# Patient Record
Sex: MALE | Race: WHITE | NOT HISPANIC OR LATINO | URBAN - METROPOLITAN AREA
[De-identification: names, ages, dates, MRNs, and addresses within clinical notes are randomized per-mention and may not be internally consistent; named-entity substitution may affect disease eponyms.]

---

## 2022-01-01 ENCOUNTER — EMERGENCY (EMERGENCY)
Facility: HOSPITAL | Age: 87
LOS: 1 days | Discharge: LEFT BEFORE DISCHARGE | End: 2022-01-01
Attending: EMERGENCY MEDICINE
Payer: MEDICARE

## 2022-01-01 ENCOUNTER — INPATIENT (INPATIENT)
Facility: HOSPITAL | Age: 87
LOS: 0 days | DRG: 308 | End: 2022-11-25
Attending: HOSPITALIST | Admitting: STUDENT IN AN ORGANIZED HEALTH CARE EDUCATION/TRAINING PROGRAM
Payer: MEDICARE

## 2022-01-01 VITALS
WEIGHT: 184.97 LBS | OXYGEN SATURATION: 95 % | HEIGHT: 69 IN | SYSTOLIC BLOOD PRESSURE: 119 MMHG | TEMPERATURE: 98 F | HEART RATE: 50 BPM | DIASTOLIC BLOOD PRESSURE: 53 MMHG | RESPIRATION RATE: 20 BRPM

## 2022-01-01 VITALS
HEART RATE: 43 BPM | RESPIRATION RATE: 20 BRPM | TEMPERATURE: 99 F | SYSTOLIC BLOOD PRESSURE: 149 MMHG | OXYGEN SATURATION: 100 % | DIASTOLIC BLOOD PRESSURE: 85 MMHG

## 2022-01-01 VITALS
SYSTOLIC BLOOD PRESSURE: 100 MMHG | WEIGHT: 199.96 LBS | TEMPERATURE: 98 F | HEART RATE: 55 BPM | HEIGHT: 67 IN | OXYGEN SATURATION: 96 % | DIASTOLIC BLOOD PRESSURE: 52 MMHG | RESPIRATION RATE: 16 BRPM

## 2022-01-01 DIAGNOSIS — D53.9 NUTRITIONAL ANEMIA, UNSPECIFIED: ICD-10-CM

## 2022-01-01 DIAGNOSIS — Z51.5 ENCOUNTER FOR PALLIATIVE CARE: ICD-10-CM

## 2022-01-01 DIAGNOSIS — R94.31 ABNORMAL ELECTROCARDIOGRAM [ECG] [EKG]: ICD-10-CM

## 2022-01-01 DIAGNOSIS — E78.5 HYPERLIPIDEMIA, UNSPECIFIED: ICD-10-CM

## 2022-01-01 DIAGNOSIS — I10 ESSENTIAL (PRIMARY) HYPERTENSION: ICD-10-CM

## 2022-01-01 DIAGNOSIS — R53.81 OTHER MALAISE: ICD-10-CM

## 2022-01-01 DIAGNOSIS — J10.1 INFLUENZA DUE TO OTHER IDENTIFIED INFLUENZA VIRUS WITH OTHER RESPIRATORY MANIFESTATIONS: ICD-10-CM

## 2022-01-01 DIAGNOSIS — G93.41 METABOLIC ENCEPHALOPATHY: ICD-10-CM

## 2022-01-01 DIAGNOSIS — I47.21 TORSADES DE POINTES: ICD-10-CM

## 2022-01-01 DIAGNOSIS — R68.89 OTHER GENERAL SYMPTOMS AND SIGNS: ICD-10-CM

## 2022-01-01 DIAGNOSIS — Z71.89 OTHER SPECIFIED COUNSELING: ICD-10-CM

## 2022-01-01 DIAGNOSIS — R00.1 BRADYCARDIA, UNSPECIFIED: ICD-10-CM

## 2022-01-01 DIAGNOSIS — Z29.9 ENCOUNTER FOR PROPHYLACTIC MEASURES, UNSPECIFIED: ICD-10-CM

## 2022-01-01 DIAGNOSIS — I25.10 ATHEROSCLEROTIC HEART DISEASE OF NATIVE CORONARY ARTERY WITHOUT ANGINA PECTORIS: ICD-10-CM

## 2022-01-01 LAB
ALBUMIN SERPL ELPH-MCNC: 3.5 G/DL — SIGNIFICANT CHANGE UP (ref 3.3–5)
ALP SERPL-CCNC: 45 U/L — SIGNIFICANT CHANGE UP (ref 40–120)
ALT FLD-CCNC: 13 U/L — SIGNIFICANT CHANGE UP (ref 10–45)
ANION GAP SERPL CALC-SCNC: 12 MMOL/L — SIGNIFICANT CHANGE UP (ref 5–17)
ANION GAP SERPL CALC-SCNC: 19 MMOL/L — HIGH (ref 5–17)
ANISOCYTOSIS BLD QL: SLIGHT — SIGNIFICANT CHANGE UP
APTT BLD: 30.8 SEC — SIGNIFICANT CHANGE UP (ref 27.5–35.5)
AST SERPL-CCNC: 53 U/L — HIGH (ref 10–40)
BASE EXCESS BLDV CALC-SCNC: -1.9 MMOL/L — SIGNIFICANT CHANGE UP (ref -2–3)
BASOPHILS # BLD AUTO: 0 K/UL — SIGNIFICANT CHANGE UP (ref 0–0.2)
BASOPHILS # BLD AUTO: 0.03 K/UL — SIGNIFICANT CHANGE UP (ref 0–0.2)
BASOPHILS NFR BLD AUTO: 0 % — SIGNIFICANT CHANGE UP (ref 0–2)
BASOPHILS NFR BLD AUTO: 0.4 % — SIGNIFICANT CHANGE UP (ref 0–2)
BILIRUB SERPL-MCNC: 1 MG/DL — SIGNIFICANT CHANGE UP (ref 0.2–1.2)
BLOOD GAS VENOUS - CREATININE: SIGNIFICANT CHANGE UP MG/DL (ref 0.5–1.3)
BUN SERPL-MCNC: 27 MG/DL — HIGH (ref 7–23)
BUN SERPL-MCNC: 33 MG/DL — HIGH (ref 7–23)
CA-I SERPL-SCNC: 1.15 MMOL/L — SIGNIFICANT CHANGE UP (ref 1.15–1.33)
CALCIUM SERPL-MCNC: 9.1 MG/DL — SIGNIFICANT CHANGE UP (ref 8.4–10.5)
CALCIUM SERPL-MCNC: 9.2 MG/DL — SIGNIFICANT CHANGE UP (ref 8.4–10.5)
CHLORIDE BLDV-SCNC: 103 MMOL/L — SIGNIFICANT CHANGE UP (ref 96–108)
CHLORIDE SERPL-SCNC: 103 MMOL/L — SIGNIFICANT CHANGE UP (ref 96–108)
CHLORIDE SERPL-SCNC: 106 MMOL/L — SIGNIFICANT CHANGE UP (ref 96–108)
CO2 BLDV-SCNC: 23 MMOL/L — SIGNIFICANT CHANGE UP (ref 22–26)
CO2 SERPL-SCNC: 16 MMOL/L — LOW (ref 22–31)
CO2 SERPL-SCNC: 20 MMOL/L — LOW (ref 22–31)
CREAT SERPL-MCNC: 1.5 MG/DL — HIGH (ref 0.5–1.3)
CREAT SERPL-MCNC: 1.64 MG/DL — HIGH (ref 0.5–1.3)
DACRYOCYTES BLD QL SMEAR: SLIGHT — SIGNIFICANT CHANGE UP
EGFR: 38 ML/MIN/1.73M2 — LOW
EGFR: 43 ML/MIN/1.73M2 — LOW
EOSINOPHIL # BLD AUTO: 0 K/UL — SIGNIFICANT CHANGE UP (ref 0–0.5)
EOSINOPHIL # BLD AUTO: 0 K/UL — SIGNIFICANT CHANGE UP (ref 0–0.5)
EOSINOPHIL NFR BLD AUTO: 0 % — SIGNIFICANT CHANGE UP (ref 0–6)
EOSINOPHIL NFR BLD AUTO: 0 % — SIGNIFICANT CHANGE UP (ref 0–6)
FERRITIN SERPL-MCNC: 365 NG/ML — SIGNIFICANT CHANGE UP (ref 30–400)
FLUAV H1 2009 PAND RNA SPEC QL NAA+PROBE: DETECTED
FOLATE SERPL-MCNC: 9.6 NG/ML — SIGNIFICANT CHANGE UP
GAS PNL BLDV: 134 MMOL/L — LOW (ref 136–145)
GAS PNL BLDV: SIGNIFICANT CHANGE UP
GAS PNL BLDV: SIGNIFICANT CHANGE UP
GLUCOSE BLDV-MCNC: 109 MG/DL — HIGH (ref 70–99)
GLUCOSE SERPL-MCNC: 113 MG/DL — HIGH (ref 70–99)
GLUCOSE SERPL-MCNC: 117 MG/DL — HIGH (ref 70–99)
HCO3 BLDV-SCNC: 22 MMOL/L — SIGNIFICANT CHANGE UP (ref 22–29)
HCT VFR BLD CALC: 39.1 % — SIGNIFICANT CHANGE UP (ref 39–50)
HCT VFR BLD CALC: 43.3 % — SIGNIFICANT CHANGE UP (ref 39–50)
HCT VFR BLDA CALC: 40 % — SIGNIFICANT CHANGE UP (ref 39–51)
HGB BLD CALC-MCNC: 13.3 G/DL — SIGNIFICANT CHANGE UP (ref 12.6–17.4)
HGB BLD-MCNC: 13.2 G/DL — SIGNIFICANT CHANGE UP (ref 13–17)
HGB BLD-MCNC: 14.4 G/DL — SIGNIFICANT CHANGE UP (ref 13–17)
IMM GRANULOCYTES NFR BLD AUTO: 0.1 % — SIGNIFICANT CHANGE UP (ref 0–0.9)
INR BLD: 1.2 RATIO — HIGH (ref 0.88–1.16)
LACTATE BLDV-MCNC: 1.8 MMOL/L — SIGNIFICANT CHANGE UP (ref 0.5–2)
LYMPHOCYTES # BLD AUTO: 0.21 K/UL — LOW (ref 1–3.3)
LYMPHOCYTES # BLD AUTO: 0.9 K/UL — LOW (ref 1–3.3)
LYMPHOCYTES # BLD AUTO: 11.6 % — LOW (ref 13–44)
LYMPHOCYTES # BLD AUTO: 2.6 % — LOW (ref 13–44)
MACROCYTES BLD QL: SLIGHT — SIGNIFICANT CHANGE UP
MAGNESIUM SERPL-MCNC: 2 MG/DL — SIGNIFICANT CHANGE UP (ref 1.6–2.6)
MAGNESIUM SERPL-MCNC: 2.4 MG/DL — SIGNIFICANT CHANGE UP (ref 1.6–2.6)
MANUAL SMEAR VERIFICATION: SIGNIFICANT CHANGE UP
MCHC RBC-ENTMCNC: 33.3 GM/DL — SIGNIFICANT CHANGE UP (ref 32–36)
MCHC RBC-ENTMCNC: 33.8 GM/DL — SIGNIFICANT CHANGE UP (ref 32–36)
MCHC RBC-ENTMCNC: 35.8 PG — HIGH (ref 27–34)
MCHC RBC-ENTMCNC: 35.9 PG — HIGH (ref 27–34)
MCV RBC AUTO: 106 FL — HIGH (ref 80–100)
MCV RBC AUTO: 108 FL — HIGH (ref 80–100)
MONOCYTES # BLD AUTO: 1.06 K/UL — HIGH (ref 0–0.9)
MONOCYTES # BLD AUTO: 1.11 K/UL — HIGH (ref 0–0.9)
MONOCYTES NFR BLD AUTO: 13.3 % — SIGNIFICANT CHANGE UP (ref 2–14)
MONOCYTES NFR BLD AUTO: 14.3 % — HIGH (ref 2–14)
NEUTROPHILS # BLD AUTO: 5.72 K/UL — SIGNIFICANT CHANGE UP (ref 1.8–7.4)
NEUTROPHILS # BLD AUTO: 6.71 K/UL — SIGNIFICANT CHANGE UP (ref 1.8–7.4)
NEUTROPHILS NFR BLD AUTO: 73.6 % — SIGNIFICANT CHANGE UP (ref 43–77)
NEUTROPHILS NFR BLD AUTO: 84.1 % — HIGH (ref 43–77)
NRBC # BLD: 0 /100 WBCS — SIGNIFICANT CHANGE UP (ref 0–0)
NRBC # BLD: 1 /100 — HIGH (ref 0–0)
OVALOCYTES BLD QL SMEAR: SLIGHT — SIGNIFICANT CHANGE UP
PCO2 BLDV: 35 MMHG — LOW (ref 42–55)
PH BLDV: 7.41 — SIGNIFICANT CHANGE UP (ref 7.32–7.43)
PHOSPHATE SERPL-MCNC: 2.7 MG/DL — SIGNIFICANT CHANGE UP (ref 2.5–4.5)
PHOSPHATE SERPL-MCNC: 4.5 MG/DL — SIGNIFICANT CHANGE UP (ref 2.5–4.5)
PLAT MORPH BLD: NORMAL — SIGNIFICANT CHANGE UP
PLATELET # BLD AUTO: 117 K/UL — LOW (ref 150–400)
PLATELET # BLD AUTO: 129 K/UL — LOW (ref 150–400)
PO2 BLDV: 59 MMHG — HIGH (ref 25–45)
POIKILOCYTOSIS BLD QL AUTO: SLIGHT — SIGNIFICANT CHANGE UP
POTASSIUM BLDV-SCNC: 4.7 MMOL/L — SIGNIFICANT CHANGE UP (ref 3.5–5.1)
POTASSIUM SERPL-MCNC: 4.2 MMOL/L — SIGNIFICANT CHANGE UP (ref 3.5–5.3)
POTASSIUM SERPL-MCNC: 5.2 MMOL/L — SIGNIFICANT CHANGE UP (ref 3.5–5.3)
POTASSIUM SERPL-SCNC: 4.2 MMOL/L — SIGNIFICANT CHANGE UP (ref 3.5–5.3)
POTASSIUM SERPL-SCNC: 5.2 MMOL/L — SIGNIFICANT CHANGE UP (ref 3.5–5.3)
PROT SERPL-MCNC: 7 G/DL — SIGNIFICANT CHANGE UP (ref 6–8.3)
PROTHROM AB SERPL-ACNC: 13.8 SEC — HIGH (ref 10.5–13.4)
RAPID RVP RESULT: DETECTED
RBC # BLD: 3.69 M/UL — LOW (ref 4.2–5.8)
RBC # BLD: 4.01 M/UL — LOW (ref 4.2–5.8)
RBC # FLD: 14.4 % — SIGNIFICANT CHANGE UP (ref 10.3–14.5)
RBC # FLD: 14.9 % — HIGH (ref 10.3–14.5)
RBC BLD AUTO: ABNORMAL
SAO2 % BLDV: 90.5 % — HIGH (ref 67–88)
SARS-COV-2 RNA SPEC QL NAA+PROBE: SIGNIFICANT CHANGE UP
SODIUM SERPL-SCNC: 135 MMOL/L — SIGNIFICANT CHANGE UP (ref 135–145)
SODIUM SERPL-SCNC: 141 MMOL/L — SIGNIFICANT CHANGE UP (ref 135–145)
TROPONIN T, HIGH SENSITIVITY RESULT: 109 NG/L — HIGH (ref 0–51)
TROPONIN T, HIGH SENSITIVITY RESULT: 97 NG/L — HIGH (ref 0–51)
VIT B12 SERPL-MCNC: 189 PG/ML — LOW (ref 232–1245)
WBC # BLD: 7.77 K/UL — SIGNIFICANT CHANGE UP (ref 3.8–10.5)
WBC # BLD: 7.98 K/UL — SIGNIFICANT CHANGE UP (ref 3.8–10.5)
WBC # FLD AUTO: 7.77 K/UL — SIGNIFICANT CHANGE UP (ref 3.8–10.5)
WBC # FLD AUTO: 7.98 K/UL — SIGNIFICANT CHANGE UP (ref 3.8–10.5)

## 2022-01-01 PROCEDURE — 82962 GLUCOSE BLOOD TEST: CPT

## 2022-01-01 PROCEDURE — 0225U NFCT DS DNA&RNA 21 SARSCOV2: CPT

## 2022-01-01 PROCEDURE — 82947 ASSAY GLUCOSE BLOOD QUANT: CPT

## 2022-01-01 PROCEDURE — 73030 X-RAY EXAM OF SHOULDER: CPT | Mod: 26,LT

## 2022-01-01 PROCEDURE — 36415 COLL VENOUS BLD VENIPUNCTURE: CPT

## 2022-01-01 PROCEDURE — 82746 ASSAY OF FOLIC ACID SERUM: CPT

## 2022-01-01 PROCEDURE — 85730 THROMBOPLASTIN TIME PARTIAL: CPT

## 2022-01-01 PROCEDURE — 82330 ASSAY OF CALCIUM: CPT

## 2022-01-01 PROCEDURE — 93010 ELECTROCARDIOGRAM REPORT: CPT

## 2022-01-01 PROCEDURE — 85610 PROTHROMBIN TIME: CPT

## 2022-01-01 PROCEDURE — 99233 SBSQ HOSP IP/OBS HIGH 50: CPT

## 2022-01-01 PROCEDURE — 99223 1ST HOSP IP/OBS HIGH 75: CPT

## 2022-01-01 PROCEDURE — 82803 BLOOD GASES ANY COMBINATION: CPT

## 2022-01-01 PROCEDURE — 93005 ELECTROCARDIOGRAM TRACING: CPT

## 2022-01-01 PROCEDURE — 82435 ASSAY OF BLOOD CHLORIDE: CPT

## 2022-01-01 PROCEDURE — 82728 ASSAY OF FERRITIN: CPT

## 2022-01-01 PROCEDURE — 84100 ASSAY OF PHOSPHORUS: CPT

## 2022-01-01 PROCEDURE — 99285 EMERGENCY DEPT VISIT HI MDM: CPT

## 2022-01-01 PROCEDURE — 99497 ADVNCD CARE PLAN 30 MIN: CPT | Mod: 25

## 2022-01-01 PROCEDURE — 99283 EMERGENCY DEPT VISIT LOW MDM: CPT

## 2022-01-01 PROCEDURE — 85025 COMPLETE CBC W/AUTO DIFF WBC: CPT

## 2022-01-01 PROCEDURE — 73030 X-RAY EXAM OF SHOULDER: CPT

## 2022-01-01 PROCEDURE — 84484 ASSAY OF TROPONIN QUANT: CPT

## 2022-01-01 PROCEDURE — 72170 X-RAY EXAM OF PELVIS: CPT | Mod: 26

## 2022-01-01 PROCEDURE — 70450 CT HEAD/BRAIN W/O DYE: CPT | Mod: MA

## 2022-01-01 PROCEDURE — 82607 VITAMIN B-12: CPT

## 2022-01-01 PROCEDURE — 96374 THER/PROPH/DIAG INJ IV PUSH: CPT

## 2022-01-01 PROCEDURE — 71045 X-RAY EXAM CHEST 1 VIEW: CPT | Mod: 26

## 2022-01-01 PROCEDURE — 85014 HEMATOCRIT: CPT

## 2022-01-01 PROCEDURE — 72125 CT NECK SPINE W/O DYE: CPT | Mod: MA

## 2022-01-01 PROCEDURE — 83540 ASSAY OF IRON: CPT

## 2022-01-01 PROCEDURE — 71045 X-RAY EXAM CHEST 1 VIEW: CPT

## 2022-01-01 PROCEDURE — 80048 BASIC METABOLIC PNL TOTAL CA: CPT

## 2022-01-01 PROCEDURE — 80053 COMPREHEN METABOLIC PANEL: CPT

## 2022-01-01 PROCEDURE — 83550 IRON BINDING TEST: CPT

## 2022-01-01 PROCEDURE — 87040 BLOOD CULTURE FOR BACTERIA: CPT

## 2022-01-01 PROCEDURE — 99284 EMERGENCY DEPT VISIT MOD MDM: CPT

## 2022-01-01 PROCEDURE — 83735 ASSAY OF MAGNESIUM: CPT

## 2022-01-01 PROCEDURE — 85018 HEMOGLOBIN: CPT

## 2022-01-01 PROCEDURE — 72125 CT NECK SPINE W/O DYE: CPT | Mod: 26,MA

## 2022-01-01 PROCEDURE — 70450 CT HEAD/BRAIN W/O DYE: CPT | Mod: 26,MA

## 2022-01-01 PROCEDURE — 84295 ASSAY OF SERUM SODIUM: CPT

## 2022-01-01 PROCEDURE — 82565 ASSAY OF CREATININE: CPT

## 2022-01-01 PROCEDURE — 72170 X-RAY EXAM OF PELVIS: CPT

## 2022-01-01 PROCEDURE — 99223 1ST HOSP IP/OBS HIGH 75: CPT | Mod: GC

## 2022-01-01 PROCEDURE — 84132 ASSAY OF SERUM POTASSIUM: CPT

## 2022-01-01 PROCEDURE — 83605 ASSAY OF LACTIC ACID: CPT

## 2022-01-01 RX ORDER — SODIUM CHLORIDE 9 MG/ML
1000 INJECTION, SOLUTION INTRAVENOUS
Refills: 0 | Status: DISCONTINUED | OUTPATIENT
Start: 2022-01-01 | End: 2022-01-01

## 2022-01-01 RX ORDER — OLANZAPINE 15 MG/1
10 TABLET, FILM COATED ORAL ONCE
Refills: 0 | Status: COMPLETED | OUTPATIENT
Start: 2022-01-01 | End: 2022-01-01

## 2022-01-01 RX ORDER — OLANZAPINE 15 MG/1
5 TABLET, FILM COATED ORAL ONCE
Refills: 0 | Status: DISCONTINUED | OUTPATIENT
Start: 2022-01-01 | End: 2022-01-01

## 2022-01-01 RX ORDER — MAGNESIUM SULFATE 500 MG/ML
2 VIAL (ML) INJECTION ONCE
Refills: 0 | Status: DISCONTINUED | OUTPATIENT
Start: 2022-01-01 | End: 2022-01-01

## 2022-01-01 RX ORDER — OLANZAPINE 15 MG/1
10 TABLET, FILM COATED ORAL ONCE
Refills: 0 | Status: DISCONTINUED | OUTPATIENT
Start: 2022-01-01 | End: 2022-01-01

## 2022-01-01 RX ORDER — INFLUENZA VIRUS VACCINE 15; 15; 15; 15 UG/.5ML; UG/.5ML; UG/.5ML; UG/.5ML
0.7 SUSPENSION INTRAMUSCULAR ONCE
Refills: 0 | Status: DISCONTINUED | OUTPATIENT
Start: 2022-01-01 | End: 2022-01-01

## 2022-01-01 RX ORDER — ASPIRIN/CALCIUM CARB/MAGNESIUM 324 MG
81 TABLET ORAL DAILY
Refills: 0 | Status: DISCONTINUED | OUTPATIENT
Start: 2022-01-01 | End: 2022-01-01

## 2022-01-01 RX ORDER — HYDROMORPHONE HYDROCHLORIDE 2 MG/ML
0.2 INJECTION INTRAMUSCULAR; INTRAVENOUS; SUBCUTANEOUS
Refills: 0 | Status: DISCONTINUED | OUTPATIENT
Start: 2022-01-01 | End: 2022-01-01

## 2022-01-01 RX ORDER — SIMVASTATIN 20 MG/1
20 TABLET, FILM COATED ORAL AT BEDTIME
Refills: 0 | Status: DISCONTINUED | OUTPATIENT
Start: 2022-01-01 | End: 2022-01-01

## 2022-01-01 RX ORDER — MAGNESIUM SULFATE 500 MG/ML
1 VIAL (ML) INJECTION ONCE
Refills: 0 | Status: COMPLETED | OUTPATIENT
Start: 2022-01-01 | End: 2022-01-01

## 2022-01-01 RX ORDER — SODIUM CHLORIDE 9 MG/ML
1000 INJECTION INTRAMUSCULAR; INTRAVENOUS; SUBCUTANEOUS ONCE
Refills: 0 | Status: COMPLETED | OUTPATIENT
Start: 2022-01-01 | End: 2022-01-01

## 2022-01-01 RX ORDER — VALPROIC ACID (AS SODIUM SALT) 250 MG/5ML
125 SOLUTION, ORAL ORAL EVERY 8 HOURS
Refills: 0 | Status: DISCONTINUED | OUTPATIENT
Start: 2022-01-01 | End: 2022-01-01

## 2022-01-01 RX ORDER — ROBINUL 0.2 MG/ML
0.4 INJECTION INTRAMUSCULAR; INTRAVENOUS EVERY 6 HOURS
Refills: 0 | Status: DISCONTINUED | OUTPATIENT
Start: 2022-01-01 | End: 2022-01-01

## 2022-01-01 RX ORDER — ACETAMINOPHEN 500 MG
650 TABLET ORAL EVERY 6 HOURS
Refills: 0 | Status: DISCONTINUED | OUTPATIENT
Start: 2022-01-01 | End: 2022-01-01

## 2022-01-01 RX ORDER — ACETAMINOPHEN 500 MG
1000 TABLET ORAL ONCE
Refills: 0 | Status: COMPLETED | OUTPATIENT
Start: 2022-01-01 | End: 2022-01-01

## 2022-01-01 RX ADMIN — Medication 400 MILLIGRAM(S): at 23:48

## 2022-01-01 RX ADMIN — Medication 0.5 MILLIGRAM(S): at 19:10

## 2022-01-01 RX ADMIN — SODIUM CHLORIDE 1000 MILLILITER(S): 9 INJECTION INTRAMUSCULAR; INTRAVENOUS; SUBCUTANEOUS at 20:33

## 2022-01-01 RX ADMIN — OLANZAPINE 10 MILLIGRAM(S): 15 TABLET, FILM COATED ORAL at 14:17

## 2022-01-01 RX ADMIN — OLANZAPINE 10 MILLIGRAM(S): 15 TABLET, FILM COATED ORAL at 16:41

## 2022-01-01 RX ADMIN — Medication 1 GRAM(S): at 19:42

## 2022-01-01 RX ADMIN — SODIUM CHLORIDE 100 MILLILITER(S): 9 INJECTION, SOLUTION INTRAVENOUS at 13:29

## 2022-01-01 RX ADMIN — Medication 0.5 MILLIGRAM(S): at 13:29

## 2022-08-07 NOTE — ED PROVIDER NOTE - PHYSICAL EXAMINATION
CONSTITUTIONAL: Well-developed; well-nourished; in no acute distress.   SKIN: warm, dry  HEAD: Normocephalic; atraumatic.  EYES: no conjunctival injection. PERRL.   ENT: No nasal discharge; airway clear.  NECK: Supple; non tender.  CARD: bradycardic with systolic murmur, irregular rhythm. equal pulses in extremities  RESP: No wheezes, rales or rhonchi. Good air movement bilaterally.   ABD: soft ntnd, no guarding, no distention, no rigidity.   EXT: No cyanosis or edema.   LYMPH: No acute cervical adenopathy.  NEURO: Alert, oriented, grossly unremarkable  PSYCH: Cooperative, appropriate.

## 2022-08-07 NOTE — ED PROVIDER NOTE - ATTENDING CONTRIBUTION TO CARE
attending Ricky: 89yM h/o HTN, HLD, CAD s/p bypass x3, afib not on ac, GERD p/w generalized malaise last night, now reports feeling at baseline. EKG on arrival showing A fib with bradycardia. Normotensive, pt alert and oriented x3 without any focal findings on examination. Pt does not wish to pursue ED workup at this time. The patient is leaving against medical advice. The patient has the capacity to refuse further medical evaluation and care. I had a lengthy discussion with the patient in which appropriate further evaluation and treatment was offered to the patient, and they declined. The patient understands that as a consequence of this decision they may be at risk for clinical deterioration including permanent disability and death and verbalized this understanding. Clear return precautions were discussed. The patient was urged to seek follow-up care, with appropriate referrals made as needed.

## 2022-08-07 NOTE — ED PROVIDER NOTE - PATIENT PORTAL LINK FT
You can access the FollowMyHealth Patient Portal offered by [facility] by registering at the following website: [email or web address]. By joining [employer]’s FollowMyHealth portal, you will also be able to view your health information using other applications (apps) compatible with our system.

## 2022-08-07 NOTE — ED PROVIDER NOTE - OBJECTIVE STATEMENT
Patient is a 89 yo M with PMH sig for HTN, HLD, CAD s/p bypass x3, afib not on ac, GERD presenting with malaise. Patient last night espoused feeling unwell and vague leg fatigue to daughter Val (at bedside). Currently has no complaints of malaise or leg complaints, states he only is hungry, wishes to leave ED. Denies any headache, dizziness, chest pain, SOB, difficulty breathing, visual changes, N/V, stool changes, urinary changes, abdominal pain, back pain, fever, chills, sore throat.

## 2022-08-07 NOTE — ED PROVIDER NOTE - NSFOLLOWUPINSTRUCTIONS_ED_ALL_ED_FT
You were seen here today at [facility] Emergency department.    You were found to have an abnormal EKG. It showed atrial fibrillation with bradycardia, meaning the heart rate was slow. While the atrial fibrillation is a known issue, the bradycardia is new. We recommended you stay for a cardiac workup, which would have included cardiac markers (troponins), blood counts, electrolyte counts, and likely an echocardiogram (ultrasound) of the heart.     Please follow up as instructed with a cardiologist. You will be contacted by Monday by phone to make an appointment. If you are not, call the number above to arrange an appointment.    You can return to the ED at any time if you change your mind to leave for further workup.

## 2022-08-07 NOTE — ED PROVIDER NOTE - CLINICAL SUMMARY MEDICAL DECISION MAKING FREE TEXT BOX
Patient is a 89 yo M with PMH  sig for HTN, HLD, CAD s/p bypass x3, afib not on ac, GERD presenting with malaise found to have new bradycardia on ekg with known afib. Patient expresses wishes to leave AMA despite recommendation for more workup. Discussed with patient risks to leaving AMA including worsening symptoms, worsening cardiac arrhythmias, and including possible death. Patient expressed understanding and wishes to leave hospital and get testing outpatient instead. Patient is alert and oriented to person and place and is able to verbalize understanding of recommendation to stay, risks of leaving, and wishes to leave. Will arrange immediate cardiology follow up for patient. Explained patient can return at any time to ED.

## 2022-08-07 NOTE — ED PROVIDER NOTE - NSICDXPASTMEDICALHX_GEN_ALL_CORE_FT
PAST MEDICAL HISTORY:  CAD (coronary artery disease) with stents    Hypercholesteremia     Hypertension     Reflux

## 2022-08-07 NOTE — ED ADULT NURSE NOTE - OBJECTIVE STATEMENT
89 y/o male presenting to the ER c/o general malaise. Pt endorsing feeling well, daughter at bedside endorsing general malaise, and B/L LE weakness, came to ER for eval. Pt presents to [facility] A&Ox2, alert to self, place, situation, disoriented to time, pt endorsing feeling well upon arrival to ER, prior to arrival endorsing general malaise and weakness, pt and daughter at bedside endorsing wanting to leave, MD at bedside and made aware. PMHx HTN, HLD, CAD, AFib (not on AC). Pt denies chest pain, SOB/difficulty breathing, fever/chills, HA, abd pain, N/V/D, lightheadedness/dizziness, numbness/tingling. Pt A&Ox3, breathing spontaneous and unlabored, pt instructed on use of call bell, bed locked and in lowest position.

## 2022-08-07 NOTE — ED ADULT NURSE NOTE - NSICDXPASTMEDICALHX_GEN_ALL_CORE_FT
PAST MEDICAL HISTORY:  CAD (coronary artery disease) with stents    Hypercholesteremia     Hypertension     Reflux     
no

## 2022-11-24 NOTE — ED ADULT NURSE NOTE - OBJECTIVE STATEMENT
Received pt in assigned area a&xo2, h/o Dementia, pt sleepy but arrousable s/p fall, pt slipped off the edge of his bed landing on his buttock, did not hit head or pass out, not taking anticoagulants, as per family, pt has weakness & fatigue for the passed few days, associated with cough, denies any dizziness, cp or sob, resps even and non labored, skin intact, pt placed on cardiac monitor, MD at bedside for eval, will continue to monitor pt, family at bedside

## 2022-11-24 NOTE — ED ADULT TRIAGE NOTE - CHIEF COMPLAINT QUOTE
lethargic x 1 month, mechanical fall, slipped off edge of bed, no head injury/LOC, +asa, difficulty wt bearing

## 2022-11-24 NOTE — ED ADULT NURSE REASSESSMENT NOTE - NS ED NURSE REASSESS COMMENT FT1
pt became more agitated again, MD aware, safety maintained, not trying to climb out of bed, pt re directed, medicated per MD orders, will continue to monitor pt, wife remains at bedside

## 2022-11-24 NOTE — ED ADULT NURSE REASSESSMENT NOTE - NS ED NURSE REASSESS COMMENT FT1
Patient back from CT scan with MD Corado and placed back on cardiac monitor. Pt went into ventricular tachycardia on monitor with pulse. MD Corado at bedside. Pt responsive to pain only. Pt placed on nonrebreather. Pt placed on pads with crash cart and suction at bedside. As per MD Corado, 1 mg IVP Magnesium to be administered. Patient's family states they want patient to be DNR and DNI. Cardiology to be consulted. Pt remains on pads, cardiac monitor, and nonrebreather. Stretcher locked in lowest position and side rails up.

## 2022-11-24 NOTE — ED ADULT NURSE REASSESSMENT NOTE - NS ED NURSE REASSESS COMMENT FT1
Pt' is less agitated, resting at this time, new IVL obtained & repeat trop sent per MD orders, awaiting CT

## 2022-11-24 NOTE — H&P ADULT - PROBLEM SELECTOR PLAN 5
- Hold antihypertensives given hypotension  - Family to update medication list in AM as they do not have the medications w/ them and pt unable to provide list given medical condition

## 2022-11-24 NOTE — ED ADULT NURSE REASSESSMENT NOTE - NS ED NURSE REASSESS COMMENT FT1
pt resting in nad, wife at bedside, awaiting dispo, remains on cardiac monitor, will continue to monitor pt pt resting in nad, pt is emil, but asymptomatic,   wife at bedside, awaiting dispo, remains on cardiac monitor, will continue to monitor pt

## 2022-11-24 NOTE — ED PROVIDER NOTE - ATTENDING CONTRIBUTION TO CARE
Patient is a 89 yo M with history of HTN, high cholesterol. CAD s/p CAG, hx of stents, hx of afib, not on AC, GERD here for evaluation after an unwitnessed fall. Patient's son-in-law is at the bedside, states patient's wife called daughter. Family arrived to try to get him up off the floor and they were unable to. He states he went over and was able to get him back in bed. He reports patient has been very weak of late. He has also had difficulty urinating for the past 1 month. Patient cannot provide any history. No recent fevers. No recent hospitalizations.     VS noted  Gen. no acute distress, Non toxic   HEENT: EOMI, mmm, atraumatic, small bruise to anterior neck  Spine: No C-T-L spine tenderness  Lungs: CTAB/L no C/ W /R   CVS: bradycardic  Abd; Soft non tender, non distended   Pelvis: stable, no ttp to bilateral hips  Ext: no edema, bruise to left shoulder  Skin: no rash  Neuro: awake, alert, non focal, clear speech  a/p: s/p unwitnessed fall. Per son-in-law, patient was on the floor for about 1 hour. He is on aspirin. Will check CT Head, CT C-spine, CXR, pelvis XR. EKG with bradycardia with HR in 40's. Patient appears weak. Will r/o infectious etiology. Will r/o ICH. Patient will likely need admission given bradycardia, weakness.   - Faustina JASON

## 2022-11-24 NOTE — ED PROVIDER NOTE - OBJECTIVE STATEMENT
Patient is a 89 year-old-male with history of HTN, HLD, CAD s/p CABG x3, atrial fibrillation not on AC, GERD, on aspirin presents with unwitnessed fall. Per son-in-law at bedside Mr. Vaughn Velasquez, patient has been getting progressively weaker and more lethargic in the last month or so, also noted to have been having increased urinary frequency and difficulty getting to the bathroom. Today, they received a call from patient's wife that patient was found on the floor, nobody witnessed the fall, unknown head trauma or LOC. Patient offers no complaints and also is reporting that he needs to urinate. Baseline mental status confused

## 2022-11-24 NOTE — H&P ADULT - PROBLEM SELECTOR PLAN 2
- Previously declined pacemaker  - Likely symptomatic   - Avoid AVN blockade   - Palliative care consult

## 2022-11-24 NOTE — H&P ADULT - PROBLEM SELECTOR PLAN 1
- Rate related QTc prolongation iso known bradycardia  - QTc prolonging medication administration   - s/p Mg  - Given extensive discussions w/ the family- pt's wishes to not have pacemaker and given all other measures would be temporary, decision made to focus on comfort and avoid pacing or ICU care at this time  - Will monitor off tele given no escalation of care requested  - Avoid QTc prolonging medications  - Avoid AV jony blockade  - Appreciate CCU recs

## 2022-11-24 NOTE — ED PROVIDER NOTE - EKG #1 DATE/TIME
Pt called- informed of vag panel, GC/Chlam, urine culture results. C/o urinary frequency. Instructed to take antibiotics as prescribed and to f/u with PMD if symptoms persist. Verb understanding.     Result Notes for URINE CULTURE, ROUTINE     Notes recorde 24-Nov-2022 12:14

## 2022-11-24 NOTE — ED ADULT NURSE NOTE - UNABLE TO OBTAIN
The patient's INR was 2.5 today and within goal. INR goal is between 2.0 and 3.0. Instructed the patient to continue his current dosing schedule and return in 4 weeks. Dementia a&ox2

## 2022-11-24 NOTE — ED ADULT NURSE REASSESSMENT NOTE - NS ED NURSE REASSESS COMMENT FT1
pt given additional Zyprexa for second attempt of CT,  pt fell asleep and calm prior to transferring pt . transferred to ct in [facility], as per wife, pt did not complete CT scan, second attempt unsuccessful , MD MADE AWARE of situation,

## 2022-11-24 NOTE — ED ADULT NURSE REASSESSMENT NOTE - NS ED NURSE REASSESS COMMENT FT1
transferred to CT & became very agitated & removed IVL, pt returned to his room, pt yelling and remains agitated, MD is aware, medicated per MD orders, will repeat trop & insert new IV when patient is more calm, will continue to monitor patient, safety maintained, wife remains at bedside

## 2022-11-24 NOTE — H&P ADULT - ASSESSMENT
Pt is a 89yo M w/ PMH of HTN, HLD, CAD s/p CABG, Afib not on AC, known bradycardia, GERD, dementia pw fall at home found to be flu positive w/ sinus bradycardia c/b prolonged QTc and TDP in the ED. Made DNR/DNI w/ limited medical intervention

## 2022-11-24 NOTE — H&P ADULT - NSHPLABSRESULTS_GEN_ALL_CORE
LABS:                      13.2   7.98  )-----------( 129      ( 24 Nov 2022 11:55 )             39.1     11-24    135  |  103  |  27<H>  ----------------------------<  117<H>  5.2   |  20<L>  |  1.50<H>    Ca    9.1      24 Nov 2022 11:55  Phos  2.7     11-24  Mg     2.0     11-24    TPro  7.0  /  Alb  3.5  /  TBili  1.0  /  DBili  x   /  AST  53<H>  /  ALT  13  /  AlkPhos  45  11-24    LIVER FUNCTIONS - ( 24 Nov 2022 11:55 )  Alb: 3.5 g/dL / Pro: 7.0 g/dL / ALK PHOS: 45 U/L / ALT: 13 U/L / AST: 53 U/L / GGT: x           PT/INR - ( 24 Nov 2022 11:55 )   PT: 13.8 sec;   INR: 1.20 ratio    PTT - ( 24 Nov 2022 11:55 )  PTT:30.8 sec    IMAGING:  Xray Chest 1 View AP/PA (11.24.22 @ 12:38)  IMPRESSION:  - Clear lungs.    CT Cervical Spine No Cont (11.24.22 @ 19:19)  IMPRESSION:  - Cervical spondylosis with no acute cervical fractures detected.    CT Head No Cont (11.24.22 @ 19:18)  IMPRESSION:  Cerebral atrophy and probable microvascular ischemic changes with no evidence of acute infarct, recent hemorrhage or hydrocephalus. No acute intracranial process is detected.    [X] Imaging personally reviewed by me  [X] ECG personally reviewed by me- Incomplete RBBB w/ prolonged QT and rate of 46

## 2022-11-24 NOTE — ED PROVIDER NOTE - PROGRESS NOTE DETAILS
Transitional Planning     Spoke with Fijian Mate at [facility] C[facility] AT [facility] to check on referral; did no receive referral. Referral resent. [phone] call from Emre Dean; patient has not been seen by PT/OT. CM notified patient's RN.     [id number] Left VM for Federica with [facility] notified her that PT saw patient today.  Left VM for [facility] admissions;     1700 call from Cassie Chery with [facility]; unable to access work on weekends, will review Monday in AM Larry PGY2  Received a call from CT tech that patient was combative and ripping out this IV while in CT. Unable to get CT. Patient is now back in the room with his wife and daughter.   Will give IM zyprexa for agitation and facilitate getting CT. Larry PGY2  Patient is progressively getting more agitated and is still pending CT. Will give another dose of zyprexa. Mickie ALVARADO) - Pt signed out to me, pending CT imaging and TBA for bradycardia, ftt and family unable to take care of pt at home due ot increased agitation.   Nurse notified me pt with ? Vtach on monitor, pt with pulse. Placed pads, pt with BP wnl. Appeared to have polymorphic vtach/torsades, given 1mg IV MgSo4 over 1 minute. HR emil again, no more runs of Vtach. CCU called.   In interim lengthy GOC conversation had with patients wife and daughter at bedside, they state pt to be DNI/DNR and they do not wish to be very aggressive. Seen by CCU and central line, medications such as isoproterenol and pacemaker/defib discussed with pts family. Apparently pt declined pacemaker in past and family do not wish for a central line or pacemaker at this time, or TVP or TCP.   They do not want ICU level of care in lines with their current goals of care.

## 2022-11-24 NOTE — ED PROVIDER NOTE - CLINICAL SUMMARY MEDICAL DECISION MAKING FREE TEXT BOX
Patient is a 89 year-old-male with history of HTN, HLD, CAD s/p CABG x3, atrial fibrillation not on AC, GERD, on aspirin presents with unwitnessed fall. Unknown head trauma/LOC. Patient is not a reliable historian. Will evaluate for infectious, metabolic or cardiac etiologies of weakness/fall. Labs, UA/UC, blood culture, EKG, CXR, xray of pelvis and L shoulder. CTH and c-spine given unknown head trauma/LOC. Anticipate admission.

## 2022-11-24 NOTE — H&P ADULT - PROBLEM SELECTOR PLAN 8
DVT ppx: SCDs for now pending comfort and GOC discussions  Diet: Dysphagia screen, if pass can start DASH diet  Dispo: Pending further clinical w/u  GOC: DNR/DNI w/ no invasive measures and no ICU care

## 2022-11-24 NOTE — ED PROVIDER NOTE - PHYSICAL EXAMINATION
General: non-toxic appearing, in no respiratory distress  HEENT: atraumatic, normocephalic; pupils are equal, round and react to light, extraocular movements intact bilaterally without deficits, no conjunctival pallor, mucous membranes moist  Neck: no jugular venous distension, full range of motion  Chest/Lung: clear to auscultation bilaterally, no wheezes/rhonchi/rales  Heart: regular rate and rhythm, no murmur/gallops/rubs  Abdomen: normal bowel sounds, soft, non-tender, non-distended  Extremities: no lower extremity edema, +2 radial pulses bilaterally, +2 dorsalis pedis pulses bilaterally  Musculoskeletal: full range of motion of all 4 extremities, ecchymosis appreciated in the L shoulder   Nervous System: alert, no motor deficits or sensory deficits; CNII-XII grossly intact  Skin: no rashes/lacerations noted

## 2022-11-24 NOTE — H&P ADULT - HISTORY OF PRESENT ILLNESS
Pt is a 89yo M w/ PMH of HTN, HLD, CAD s/p CABG, Afib not on AC, known bradycardia, GERD, dementia pw fall at home.    Pt unable to provide history d/t lethargy. History obtained from chart review and family members (daughter Val and wife Desire) over the telephone. At baseline, pt w/ dementia and becoming more and more forgetful, especially w/ short term memory. Over the past month, daughter states he has been more weak and lethargic, mainly waking up to eat, use the restroom and goes back to sleep. On the day of presentation pt slid out of his bed to the floor d/t profound weakness and inability to support himself up. He required the assistance of family members to get back into the bed. This prompted the family to call EMS and bring the patient to the ED for further evaluation.    While in the ED pt noted to be bradycardic to 30-40s and agitated s/p Zyprexa. He had an episode of polymorphic VT and Torsades De Pointes noted on tele for which he was administered Mg. Discussions were had w/ the family at that time and pt was made DNR/DNI w/ focus on comfort and avoiding heroic measures. ED, cards as well as myself discussed w/ family regarding GOC and pacing and ICU care and they wishes for him to be comfortable and avoid such measures as he had previously declined pacemaker and these measures would only be temporary.     In the ED he was noted to be febrile, bradycardic and hypotensive. Lab work notable for positive flu. He was administered Zyprexa 10mg x2, Ativan 0.5mg x1, Mg for TDP and Ofirmev for fever.

## 2022-11-25 NOTE — DIETITIAN INITIAL EVALUATION ADULT - NSFNSGIIOFT_GEN_A_CORE
- RN denies nausea, vomiting, diarrhea.   - Last BM: PTA; currently ordered for bowel regimen: Dulcolax

## 2022-11-25 NOTE — PROGRESS NOTE ADULT - PROBLEM SELECTOR PLAN 8
DVT ppx: SCDs for now pending comfort and GOC discussions  Diet: Dysphagia screen, if pass can start full liquid diet and advance as tolerated  Dispo: Pending further clinical w/u  GOC: DNR/DNI w/ no invasive measures and no ICU care    discussed with family via telephone

## 2022-11-25 NOTE — PROGRESS NOTE ADULT - CONVERSATION DETAILS
Discussed patient's present clinical status and care plan. I inquired further about goals of care with pt's daughter/son-in-aw and his wife over the phone. Family does not want escalate in care beyond IVF, IV antmicrobials and HFNC if needed. They do not want BIPAP for him. They  understand he is not able to get the tamiflu due to dysphagia but do not want him to have an NGT for med administration. They agree to unsecured mittens to mitigate risk of pulling lines/monitors. Would be amenable to PCU if patient further decompensates.

## 2022-11-25 NOTE — CONSULT NOTE ADULT - SUBJECTIVE AND OBJECTIVE BOX
Patient seen and evaluated at bedside    Chief Complaint: Lethargy and fall    HPI:   89 year-old-male with history of HTN, HLD, CAD s/p CABG x3, atrial fibrillation not on AC, GERD, and dementia  who presents after witnessed fall and not acting right per family. Pt daughter Val and Wife shelia are bedside. They state that the patient has baseline dementia, very forgetful, forgets a conversation 5 minutes after the conversation. He has been progressively declining recently and has gotten significantly worse over the past month. He has been more fatigued and lethargic and urinating without reaching the bathroom, refusing adult diapers, etc. This morning he felt week, slid off of his bed, witnessed by wife, no head trauma, but couldn't get up. Daughter called a cousin, who helped put him back in bed. The daughter came to the house, pt was sleeping, and daughter felt that patient should be evaluated so they called EMS.    In the ED, patient with bradycardia likely rate related QTC prolongation, received zyprexa for agitation, and had an episode of polymorphic VT, with Tele representing Torsades De Pointes. After discussion with the daughter and wife bedside, pt with known bradycardia, refused pacemaker in the past, and his wishes are not to have any aggressive measures, including no CPR, no intubation, no central lines, and no ICU.       PMHx:   Hypercholesteremia    Hypertension    CAD (coronary artery disease)    Reflux        PSHx:   Hx of CABG    Cataract, bilateral        Allergies:  No Known Allergies      Home Meds:  family does not have the med list on them.   Current Medications:   magnesium sulfate  IVPB 2 Gram(s) IV Intermittent Once          REVIEW OF SYSTEMS:    [x ] Unable to assess ROS due to      Physical Exam:  T(F): 97.8 (11-24), Max: 98.3 (11-24)  HR: 51 (11-24) (44 - 51)  BP: 131/84 (11-24) (95/50 - 131/84)  RR: 19 (11-24)  SpO2: 95% (11-24)  GENERAL: No acute distress, sleeping  HEAD:  Atraumatic, Normocephalic  CHEST/LUNG: Course breath sounds.   HEART: Bradycardic. S1, S2  ABDOMEN: Soft,   EXTREMITIES:  No clubbing or  cyanosis,       Cardiovascular Diagnostic Testing:    ECG: Personally reviewed: Junctional rhtyhm with left axis.       CXR: Personally reviewed    Labs: Personally reviewed                        13.2   7.98  )-----------( 129      ( 24 Nov 2022 11:55 )             39.1     11-24    135  |  103  |  27<H>  ----------------------------<  117<H>  5.2   |  20<L>  |  1.50<H>    Ca    9.1      24 Nov 2022 11:55  Phos  2.7     11-24  Mg     2.0     11-24    TPro  7.0  /  Alb  3.5  /  TBili  1.0  /  DBili  x   /  AST  53<H>  /  ALT  13  /  AlkPhos  45  11-24    PT/INR - ( 24 Nov 2022 11:55 )   PT: 13.8 sec;   INR: 1.20 ratio         PTT - ( 24 Nov 2022 11:55 )  PTT:30.8 sec        
HPI:  Pt is a 89yo M w/ PMH of HTN, HLD, CAD s/p CABG, Afib not on AC, known bradycardia, GERD, dementia pw fall at home.    Pt unable to provide history d/t lethargy. History obtained from chart review and family members (daughter Val and wife Desire) over the telephone. At baseline, pt w/ dementia and becoming more and more forgetful, especially w/ short term memory. Over the past month, daughter states he has been more weak and lethargic, mainly waking up to eat, use the restroom and goes back to sleep. On the day of presentation pt slid out of his bed to the floor d/t profound weakness and inability to support himself up. He required the assistance of family members to get back into the bed. This prompted the family to call EMS and bring the patient to the ED for further evaluation.    While in the ED pt noted to be bradycardic to 30-40s and agitated s/p Zyprexa. He had an episode of polymorphic VT and Torsades De Pointes noted on tele for which he was administered Mg. Discussions were had w/ the family at that time and pt was made DNR/DNI w/ focus on comfort and avoiding heroic measures. ED, cards as well as myself discussed w/ family regarding GOC and pacing and ICU care and they wishes for him to be comfortable and avoid such measures as he had previously declined pacemaker and these measures would only be temporary.      Pt seen and examined at bedside this morning. He mumbles and is not responsive during interview. Chart reviewed. Pt received Ativan 0.5mg IV x1 and Zyprexa 10mg IM x1 in the last 24 hours.     PERTINENT PM/SXH:   Hypercholesteremia    Hypertension    CAD (coronary artery disease)    Reflux      Hx of CABG    Cataract, bilateral      FAMILY HISTORY:  No pertinent family history in first degree relatives      Family Hx substance abuse [ ]yes [ ]no  ITEMS NOT CHECKED ARE NOT PRESENT    SOCIAL HISTORY:   Significant other/partner[X ]  Children[X ]  Yazidism/Spirituality: Worship  Substance hx:  [ ]   Tobacco hx:  [ ]   Alcohol hx: [ ]   Home Opioid hx:  [ ] I-Stop Reference No:  Living Situation: [ X]Home  [ ]Long term care  [ ]Rehab [ ]Other    ADVANCE DIRECTIVES:    DNR/MOLST  [X ]  Living Will  [ ]   DECISION MAKER(s):  [ ] Health Care Proxy(s)  [X ] Surrogate(s)  [ ] Guardian           Name(s): Phone Number(s):  Val Cantrell    BASELINE (I)ADL(s) (prior to admission):  [place]: [ ]Total  [ ] Moderate [X ]Dependent    Allergies    No Known Allergies    Intolerances    MEDICATIONS  (STANDING):  aspirin  chewable 81 milliGRAM(s) Oral daily  influenza  Vaccine (HIGH DOSE) 0.7 milliLiter(s) IntraMuscular once  oseltamivir 75 milliGRAM(s) Oral two times a day  simvastatin 20 milliGRAM(s) Oral at bedtime    MEDICATIONS  (PRN):    PRESENT SYMPTOMS: [X ]Unable to self-report  [ ] CPOT [ X] PAINADs [X ] RDOS  Source if other than patient:  [ ]Family   [ X]Team     Pain: [ ]yes [ ]no  QOL impact -   Location -                    Aggravating factors -  Quality -  Radiation -  Timing-  Severity (0-10 scale):  Minimal acceptable level (0-10 scale):     CPOT:    https://www.[facility].org/getattachment/tjk97s57-2b2o-0g0p-9o2y-4027d5553h7r/Critical-Care-Pain-Observation-Tool-(CPOT)    PAIN AD Score: 5  http://geriatrictoolkit.[place].[facility]/cog/painad.pdf (press ctrl +  left click to view)    Dyspnea:                           [ ]Mild [ ]Moderate [ ]Severe      RDOS: 4  0 to 2  minimal or no respiratory distress   3  mild distress  4 to 6 moderate distress  >7 severe distress  https://homecareinformation.net/handouts/hen/Respiratory_Distress_Observation_Scale.pdf (Ctrl +  left click to view)     Anxiety:                             [ ]Mild [ ]Moderate [ ]Severe  Fatigue:                             [ ]Mild [ ]Moderate [ ]Severe  Nausea:                             [ ]Mild [ ]Moderate [ ]Severe  Loss of appetite:              [ ]Mild [ ]Moderate [ ]Severe  Constipation:                    [ ]Mild [ ]Moderate [ ]Severe    PCSSQ[Palliative Care Spiritual Screening Question]   Severity (0-10):  Score of 4 or > indicate consideration of Chaplaincy referral.  Chaplaincy Referral: [ ] yes [ ] refused [ ] following [X ] Deferred     Caregiver [place]? : [X ] yes [ ] no [ ] Deferred [ ] Declined             Social work referral [ ] Patient & Family Centered Care Referral [ ]     Anticipatory Grief present?:  [X ] yes [ ] no  [ ] Deferred                  Social work referral [ ] Chaplaincy Referral[ ]      Other Symptoms:  [ ]All other review of systems negative - pt nonverbal/unable to report     Palliative Performance Status Version 2:     20    %    http://[facility].org/files/news/palliative_performance_scale_ppsv2.pdf  PHYSICAL EXAM:  Vital Signs Last 24 Hrs  T(C): 37 (25 Nov 2022 09:58), Max: 38.7 (24 Nov 2022 22:26)  T(F): 98.6 (25 Nov 2022 09:58), Max: 101.6 (24 Nov 2022 22:26)  HR: 42 (25 Nov 2022 09:58) (41 - 69)  BP: 154/59 (25 Nov 2022 09:58) (95/50 - 154/59)  BP(mean): 67 (24 Nov 2022 20:18) (54 - 67)  RR: 20 (25 Nov 2022 09:58) (17 - 33)  SpO2: 97% (25 Nov 2022 09:58) (95% - 100%)    Parameters below as of 25 Nov 2022 09:58  Patient On (Oxygen Delivery Method): nasal cannula  O2 Flow (L/min): 5   I&O's Summary    GENERAL: [ ]Cachexia    [ ]Alert  [ ]Oriented x   [X ]Lethargic  [ ]Unarousable  [ ]Verbal  [X ]Non-Verbal - incoherent mumbles  Behavioral:   [ ] Anxiety  [X ] Delirium [ ] Agitation [ ] Other  HEENT:  [ ]Normal   [ X]Dry mouth   [ ]ET Tube/Trach  [ ]Oral lesions  PULMONARY:   [ ]Clear [ ]Tachypnea  [ X]Audible excessive secretions   [ ]Rhonchi        [ ]Right [ ]Left [ ]Bilateral  [ ]Crackles        [ ]Right [ ]Left [ ]Bilateral  [ ]Wheezing     [ ]Right [ ]Left [ ]Bilateral  [ ]Diminished breath sounds [ ]right [ ]left [ ]bilateral  CARDIOVASCULAR:    [ ]Regular [ ]Irregular [X ]Tachy  [ ]Hiram [ ]Murmur [ ]Other  GASTROINTESTINAL:  [X ]Soft  [ ]Distended   [ ]+BS  [ X]Non tender [ ]Tender  [ ]Other [ ]PEG [ ]OGT/ NGT  Last BM: unknown  GENITOURINARY:  [ ]Normal [X ] Incontinent   [ ]Oliguria/Anuria   [ ]Rasheed  MUSCULOSKELETAL:   [ ]Normal   [X ]Weakness  [X ]Bed/Wheelchair bound [ ]Edema  NEUROLOGIC:   [ ]No focal deficits  [X ]Cognitive impairment  [ ]Dysphagia [ ]Dysarthria [ ]Paresis [ ]Other   SKIN:   [ ]Normal  [ ]Rash  [ ]Other  [ X]Pressure ulcer(s)       Present on admission [X ]y [ ]n - stage 1 to sacrum    CRITICAL CARE:  [ ] Shock Present  [ ]Septic [ ]Cardiogenic [ ]Neurologic [ ]Hypovolemic  [ ]  Vasopressors [ ]  Inotropes   [ ]Respiratory failure present [ ]Mechanical ventilation [ ]Non-invasive ventilatory support [ ]High flow    [ ]Acute  [ ]Chronic [ ]Hypoxic  [ ]Hypercarbic [ ]Other  [ ]Other organ failure     LABS:                        14.4   7.77  )-----------( 117      ( 25 Nov 2022 07:08 )             43.3   11-25    141  |  106  |  33<H>  ----------------------------<  113<H>  4.2   |  16<L>  |  1.64<H>    Ca    9.2      25 Nov 2022 11:03  Phos  4.5     11-25  Mg     2.4     11-25    TPro  7.0  /  Alb  3.5  /  TBili  1.0  /  DBili  x   /  AST  53<H>  /  ALT  13  /  AlkPhos  45  11-24  PT/INR - ( 24 Nov 2022 11:55 )   PT: 13.8 sec;   INR: 1.20 ratio         PTT - ( 24 Nov 2022 11:55 )  PTT:30.8 sec      RADIOLOGY & ADDITIONAL STUDIES:    PROCEDURE DATE:  11/24/2022          INTERPRETATION:  PROCEDURE INFORMATION:  Exam: CT Head Without Contrast  Exam date and time: 11/24/2022 7:06 PM  Age: 89 years old  Clinical indication: Fall, unknown head trauma/loc    TECHNIQUE:  Imaging protocol: Computed tomography of the head without contrast.    COMPARISON:  CT CERVICAL SPINE 11/24/2022 1:38 PM    FINDINGS:  Brain: Prominence of cerebral sulci reflects diffuse cerebral atrophy.   Poorly  marginated hypodensities seen throughout the deep and periventricular   white  matter of both cerebral hemispheres are consistent with microvascular   ischemic  changes. Brainstem and cerebellum are unremarkable and there is no   evidence of  acute transcortical infarction or recent intracranial hemorrhage.  Cerebral ventricles: Dilatation of the 3rd and lateral ventricles is  commensurate with the degree of cerebral atrophy.  Paranasal sinuses: Grossly clear throughout.  Mastoid air cells:Grossly clear bilaterally.    Bones/joints: Bony calvarium and skull base are intact and no acute   fractures  are detected.  Soft tissues: Unremarkable.    IMPRESSION:  Cerebral atrophy and probable microvascular ischemic changes with no   evidence  of acute infarct, recent hemorrhage or hydrocephalus. No acute   intracranial  process is detected.    --- End of Report ---      PROTEIN CALORIE MALNUTRITION PRESENT: [ ]mild [ ]moderate [ ]severe [ ]underweight [ ]morbid obesity  https://www.andeal.org/vault/2440/web/files/ONC/Table_Clinical%20Characteristics%20to%20Document%20Malnutrition-White%20JV%20et%20al%202012.pdf    Height (cm): 175.3 (11-24-22 @ 11:08), 170.2 (08-07-22 @ 00:46)  Weight (kg): 83.9 (11-24-22 @ 11:08), 90.7 (08-07-22 @ 00:46)  BMI (kg/m2): 27.3 (11-24-22 @ 11:08), 31.3 (08-07-22 @ 00:46)    [ X]PPSV2 < or = to 30% [ ]significant weight loss  [ ]poor nutritional intake  [ ]anasarca[ ]Artificial Nutrition      Other REFERRALS:  [ ]Hospice  [ ]Child Life  [ X]Social Work  [ ]Case management [ ]Holistic Therapy     Goals of Care Document:

## 2022-11-25 NOTE — PROVIDER CONTACT NOTE (MEDICATION) - ASSESSMENT
pt is lethargic, arouses with touch and pain. does not open eyes. not in acute distress at this time.

## 2022-11-25 NOTE — DIETITIAN INITIAL EVALUATION ADULT - REASON
Nutrition Focused Physical Exam deferred at this time, pt unable to provide consent. Not appropriate.

## 2022-11-25 NOTE — PROVIDER CONTACT NOTE (OTHER) - ASSESSMENT
See vs flowsheet.
no acute distress. other vs stable. still noted with confusion, combative at times.
No response to external stimuli  No spontaneous respirations  No apical heart rate  Negative papillary response to light

## 2022-11-25 NOTE — CONSULT NOTE ADULT - ATTENDING COMMENTS
Palliative consulted for GOC in patient with dementia, bradycardia to 30's with no plans for PPM, and encephalopathy. On exam, patient with some restlessness but mainly mumbling in bed without appearance of distress. We made outreach to patients family, and despite them being clear that they wouldn't want PPM and advance directives in place for DNR/DNI, they were not amenable to a symptom directed approach, pharmacologic management of agitaiton with any sedatives or hospice care. In my personal opinion, this patient is appropriate for PCU and appears to be dying, but was not in line with goals as discussed with family. Primary team updated and we will continue to follow to monitor clinical status. If agitation, would consider behavioral health consultation given limitations in agents available in patient with Qtc prolongation, and families refusal of meds such as ativan. Alternative to consider would be depakote.    Remain available and will continue to follow case. [phone]

## 2022-11-25 NOTE — DIETITIAN INITIAL EVALUATION ADULT - ADD RECOMMEND
1) New malnutrition notification sent.   2) Upon diet advancement, recommend  no therapeutic diet restrictions. Defer textures and consistencies to team.   3) Encourage adequate intake of meals to optimize PO intake.   4) [place] GOC and family requests.

## 2022-11-25 NOTE — CONSULT NOTE ADULT - PROBLEM SELECTOR RECOMMENDATION 4
- extensive discussions w/ the family and primary team- pt's wishes to not have pacemaker and given all other measures would be temporary, decision made to focus on comfort and avoid pacing or ICU care at this time - extensive discussions w/ the family and primary team- pt's wishes to not have pacemaker and given all other measures would be temporary

## 2022-11-25 NOTE — DIETITIAN INITIAL EVALUATION ADULT - PERTINENT LABORATORY DATA
11-25    141  |  106  |  33<H>  ----------------------------<  113<H>  4.2   |  16<L>  |  1.64<H>    Ca    9.2      25 Nov 2022 11:03  Phos  4.5     11-25  Mg     2.4     11-25    TPro  7.0  /  Alb  3.5  /  TBili  1.0  /  DBili  x   /  AST  53<H>  /  ALT  13  /  AlkPhos  45  11-24

## 2022-11-25 NOTE — DISCHARGE NOTE FOR THE EXPIRED PATIENT - HOSPITAL COURSE
Pt is a 89yo M w/ PMH of HTN, HLD, CAD s/p CABG, Afib not on AC, known bradycardia, GERD, dementia presented with fall at home and encephalopathy,   In ED had bradycardia (HR in 30s).  pt and family refusing pacemaker placement.    Also had an episode of polymorphic VT and Torsades De Pointes noted on tele for which he was administered Mg.  Pt Influenza A positive.  Also noted to be agitated s/p Zyprexa.  Discussions with family involving teams of  ED, ICU, Cardiology and palliative, concluded with the family deciding to make pt DNR/DNI.  Family wishes for pt to be comfortable and avoid heroic measures, they wanted pt to be transferred to palliative care unit. Family wanted medical team to focus on alleviating symptoms only They did not want ICU care and further declined pacemarker placement. Requested to take pt off tele, electing for a full symptom directed approach. Pt had Qtc prolongation so ordered depakote and prn ativan for dyspnea and agitation.   For respiratory failure due to influenza started on high flow 2/2 influenza.  dilaudid PRN ordred for pain and dyspnea.  DNR/DNI confirmed and MOLST in chart.   Pt transferred to PCU. At 7:27pm he was noted to be without spontaneous respirations or pulse.   Also noted no response to external stimuli, no spontaneous respirations, no apical heart rate.  pt was pronounced dead at 7:30pm by medical staff. family was at bedside.

## 2022-11-25 NOTE — PROGRESS NOTE ADULT - SUBJECTIVE AND OBJECTIVE BOX
Teja Matthew MD  Division of Hospital Medicine  Available on MS teams until 7pm  If no response or off-hours, page [phone]  -------------------------------------    Patient is a 89y old  Male who presents with a chief complaint of Bradycardia (24 Nov 2022 23:36)      SUBJECTIVE / OVERNIGHT EVENTS: none acute  ADDITIONAL REVIEW OF SYSTEMS: pt nonverbal, withdraws to stimuli but does not open eyes, unable to get ROS.     MEDICATIONS  (STANDING):  aspirin  chewable 81 milliGRAM(s) Oral daily  dextrose 5% + sodium chloride 0.45%. 1000 milliLiter(s) (100 mL/Hr) IV Continuous <Continuous>  influenza  Vaccine (HIGH DOSE) 0.7 milliLiter(s) IntraMuscular once  oseltamivir 75 milliGRAM(s) Oral two times a day  simvastatin 20 milliGRAM(s) Oral at bedtime    MEDICATIONS  (PRN):      CAPILLARY BLOOD GLUCOSE        I&O's Summary      PHYSICAL EXAM:  Vital Signs Last 24 Hrs  T(C): 37 (25 Nov 2022 09:58), Max: 38.7 (24 Nov 2022 22:26)  T(F): 98.6 (25 Nov 2022 09:58), Max: 101.6 (24 Nov 2022 22:26)  HR: 42 (25 Nov 2022 09:58) (41 - 69)  BP: 154/59 (25 Nov 2022 09:58) (95/50 - 154/59)  BP(mean): 67 (24 Nov 2022 20:18) (54 - 67)  RR: 20 (25 Nov 2022 09:58) (17 - 33)  SpO2: 97% (25 Nov 2022 09:58) (95% - 100%)    Parameters below as of 25 Nov 2022 09:58  Patient On (Oxygen Delivery Method): nasal cannula  O2 Flow (L/min): 5    CONSTITUTIONAL: NAD  EYES: PERRLA; conjunctiva and sclera clear  ENMT: MMM  NECK: Supple  RESPIRATORY: supper airway secretions/gurgling, poor inspiratory effort  CARDIOVASCULAR: RRR, no JVD, no peripheral edema   ABDOMEN: Nontender to palpation, normoactive BS, no guarding/rigidity  MUSCLOSKELETAL:  no clubbing/cyanosis, no joint swelling or tenderness to palpation  PSYCH: unable to assess  NEUROLOGY: unable to assess  SKIN: No rashes; no palpable lesions    LABS:                        14.4   7.77  )-----------( 117      ( 25 Nov 2022 07:08 )             43.3     11-25    141  |  106  |  33<H>  ----------------------------<  113<H>  4.2   |  16<L>  |  1.64<H>    Ca    9.2      25 Nov 2022 11:03  Phos  4.5     11-25  Mg     2.4     11-25    TPro  7.0  /  Alb  3.5  /  TBili  1.0  /  DBili  x   /  AST  53<H>  /  ALT  13  /  AlkPhos  45  11-24    PT/INR - ( 24 Nov 2022 11:55 )   PT: 13.8 sec;   INR: 1.20 ratio         PTT - ( 24 Nov 2022 11:55 )  PTT:30.8 sec            RADIOLOGY & ADDITIONAL TESTS:  Results Reviewed:   Imaging Personally Reviewed:  Electrocardiogram Personally Reviewed:    COORDINATION OF CARE:  Care Discussed with Consultants/Other Providers [Y/N]: floor np  Prior or Outpatient Records Reviewed [Y/N]:

## 2022-11-25 NOTE — PROVIDER CONTACT NOTE (OTHER) - SITUATION
Patient without spontaneous respirations or pulse
Heart rate at 30's
pt obtunded , hr - 40 - 50 s , s/p episode of v tach , on 15 l nrbm . dnr/dni . Pt had an episode of torsades pointes in ED.

## 2022-11-25 NOTE — DIETITIAN INITIAL EVALUATION ADULT - ENERGY INTAKE
Pt is currently not ordered for a diet, pending SLP evaluation.     Nutrition-related concerns:  - Pt ordered for Dextrose 5% + NaCl 0.45%  - Palliative following, GOC discussed with family.

## 2022-11-25 NOTE — PROVIDER CONTACT NOTE (OTHER) - REASON
Expiration
bradycardia
pt obtunded , hr - 40 - 50 s , s/p episode of v tach , on 15 l nrbm on admission .

## 2022-11-25 NOTE — CONSULT NOTE ADULT - PROBLEM SELECTOR RECOMMENDATION 9
- currently influenza A+ on Tamiflu  - A&Ox2 at baseline, currently A&Ox0 - currently influenza A+ on Tamiflu  - A&Ox2 at baseline, currently A&Ox0  - if agitation, given Qtc prolongation, would avoid meds such as haldol/seroquel/risperdone. Alternatively can consider depakote IV. Family verbalized that they do not want patient receiving ativan

## 2022-11-25 NOTE — PROVIDER CONTACT NOTE (OTHER) - ACTION/TREATMENT ORDERED:
NP Anastasia aware. No need of telemetry for the pt if there is no order. Continue to monitor . NP Anastasia aware. No need of telemetry for the pt if there is no order. Pt can be hooked to nc 5 l o2 . Continue to monitor .

## 2022-11-25 NOTE — CONSULT NOTE ADULT - PROBLEM SELECTOR RECOMMENDATION 8
Introduced the [facility] team to the pt's daughter over the phone. Discussed symptom based approach with pt's daughter on the phone but she appeared hesitant to certain medications that may make her father appear "tranquilized". Pt appears PCU appropriate, will follow up with pt's family for ongoing GOC discussion. Introduced the [facility]AP team to the pt's daughter over the phone. Discussed symptom based approach with pt's daughter on the phone but she appeared hesitant to certain medications that may make her father appear "tranquilized". Pt appears PCU appropriate but not in line with family goals per our discussion, will follow up with pt's family for ongoing GOC discussion.

## 2022-11-25 NOTE — PROGRESS NOTE ADULT - PROBLEM SELECTOR PLAN 2
- Previously declined pacemaker  - Likely symptomatic   - Avoid AVN blockade   - Palliative care consulted

## 2022-11-25 NOTE — PROGRESS NOTE ADULT - PROBLEM SELECTOR PLAN 3
- Positive on admission  - Start Tamiflu 75mg BID- family declining NGT for med administration; understand pt is not receiving it if he cannot demonstrate swallowing  - Wean O2 as tolerated, ok to use HFNC if needed

## 2022-11-25 NOTE — CONSULT NOTE ADULT - CONVERSATION DETAILS
Spoke to pt's daughter Desire on the phone regarding the pt' clinical and functional decline over the last year. Desire reports pt lays in bed most of the day, occasionally getting up to eat/use the restroom and he sleeps most of the day. Desire discussed that she and her mother have decided for no extraordinary measures such as resuscitation or intubation and that Juliette had expressed that he did not want a pacemaker. DNR/DNI was signed and MOLST form was filled out. Desire stated she wanted her father to be comfortable and if possible, for him to return home. We discussed that he is currently unable to take anything by mouth due to his mental status. We also discussed that due to his agitation, he had received medications in the last 24 hours. Desire stated she did not want him to receive such medications as they made his bradycardia worse and she does not want him to "tranquilized". I introduced hospice services to her over the phone but Desire was resistant as she had a family member on hospice in the past. She plans to see her father with her mother later today. I discussed that the [facility] team will follow up with them next week and see how Juliette does. Emotional support provided. Spoke to pt's daughter Val on the phone regarding the pt' clinical and functional decline over the last year. Val reports pt lays in bed most of the day, occasionally getting up to eat/use the restroom and he sleeps most of the day. Val discussed that she and her mother have decided for no extraordinary measures such as resuscitation or intubation and that Juliette had expressed that he did not want a pacemaker. DNR/DNI was signed and MOLST form was filled out. Val stated she wanted her father to be comfortable and if possible, for him to return home. We discussed that he is currently unable to take anything by mouth due to his mental status. We also discussed that due to his agitation, he had received medications in the last 24 hours. Val stated she did not want him to receive such medications as they made his bradycardia worse and she does not want him to "tranquilized". I introduced hospice services to her over the phone but Val was resistant as she had a family member on hospice in the past. She plans to see her father with her mother later today. I discussed that the [facility] team will follow up with them next week and see how Juliette does. Emotional support provided.

## 2022-11-25 NOTE — DIETITIAN INITIAL EVALUATION ADULT - ORAL INTAKE PTA/DIET HISTORY
Pt seen asleep in bed during RD visit. Per electronic medical record, pt with hx of dementia, A&Ox1. No specific diet hx noted. Baseline tolerance to chewing/swallowing unknown; baseline provision of energy/ nutrient intake unclear. Per H&P pt noted with poor appetite and unintentional weight loss PTA.

## 2022-11-25 NOTE — DIETITIAN INITIAL EVALUATION ADULT - PERTINENT MEDS FT
MEDICATIONS  (STANDING):  dextrose 5% + sodium chloride 0.45%. 1000 milliLiter(s) (100 mL/Hr) IV Continuous <Continuous>  valproate sodium  IVPB 125 milliGRAM(s) IV Intermittent every 8 hours    MEDICATIONS  (PRN):  acetaminophen  Suppository .. 650 milliGRAM(s) Rectal every 6 hours PRN Temp greater or equal to 38C (100.4F)  bisacodyl Suppository 10 milliGRAM(s) Rectal daily PRN Constipation  glycopyrrolate Injectable 0.4 milliGRAM(s) IV Push every 6 hours PRN secretions  HYDROmorphone  Injectable 0.2 milliGRAM(s) IV Push every 1 hour PRN dyspnea  HYDROmorphone  Injectable 0.2 milliGRAM(s) IV Push every 1 hour PRN Severe Pain (7 - 10)  LORazepam   Injectable 0.2 milliGRAM(s) IV Push every 1 hour PRN anxiety/agitation/refractory dyspnea

## 2022-11-25 NOTE — DISCHARGE NOTE FOR THE EXPIRED PATIENT - REASON FOR ADMISSION
Per chart, "Pt is a 89yo M w/ PMH of HTN, HLD, CAD s/p CABG, Afib not on AC, known bradycardia, GERD, dementia pw fall at home."

## 2022-11-25 NOTE — CONSULT NOTE ADULT - ASSESSMENT
89 year-old-male with history of HTN, HLD, CAD s/p CABG x3, atrial fibrillation not on AC, GERD, and dementia  who presents after witnessed fall and not acting right per family, with rate related QTC prolongation c/b polymorphic VT, Torsades de pointes, CICU consulted.     #Polymorphic VT, Torsade De Pointes  -Pt with apparent rate related QTC prolongation, in the setting of known bradycardia for which he has refused pacemaker previously.   After discussion with the daughter Val and wife Desire bedside, pt's wishes are not to have any aggressive measures, including no CPR, no intubation, no central lines, and no ICU.  Therefore, there is no role for CICU admission at this time. Please hold any AVN blockers or rate lowering agents. As they do not want any escalation in care should he go into torsades again, there is likely no role for telemetry. Recommend further addressing GOC with family and palliative care consult.    Case Discussed with Dr. Gomez.      
Pt is a 89 y/o M w/ PMH of HTN, HLD, CAD s/p CABG, Afib not on AC, known bradycardia, GERD, dementia p/w fall at home found to be flu positive w/ sinus bradycardia c/b prolonged QTc and TDP in the ED. Made DNR/DNI w/ limited medical intervention. [facility] team consulted for GOC discussion.

## 2022-11-25 NOTE — PROVIDER CONTACT NOTE (OTHER) - RECOMMENDATIONS
pt to be hooked on telemetry ? pt to be hooked on telemetry ? . Pt tolerating on 5 l o2 nc  , so stepdown from nrbm ?

## 2022-11-25 NOTE — DISCHARGE NOTE FOR THE EXPIRED PATIENT - SECONDARY DIAGNOSIS.
Severe sinus bradycardia CAD (coronary artery disease) HLD (hyperlipidemia) Quadriplegia, functional Advanced care planning/counseling discussion Palliative care encounter

## 2022-11-25 NOTE — PATIENT PROFILE ADULT - FALL HARM RISK - HARM RISK INTERVENTIONS

## 2022-11-25 NOTE — DIETITIAN INITIAL EVALUATION ADULT - REASON INDICATOR FOR ASSESSMENT
Nutrition consult warranted for: decrease of oral intake 3 days prior to admission   Information obtained from: electronic medical record and RN.  Chart reviewed, events noted.

## 2022-11-25 NOTE — DIETITIAN INITIAL EVALUATION ADULT - OTHER INFO
Weight Hx Per:  - Source: electronic medical record   - UBW: unknown    Weight Hx Per [facility] HIE: 90.7 kg (08/07/22)    Current Admission Weights:  - Dosing weight: 184.15 pounds (83.9 kg) (11/24)  - Daily weight: none documented thus far    **  Will continue to monitor weight trends as available/able.     IBW: 160 pounds   %IBW: 115%

## 2022-11-25 NOTE — PATIENT PROFILE ADULT - TOBACCO USE
Leonor Ernst Hospitalist   Progress Note    Admitting Date and Time: 7/9/2022  7:14 PM  Admit Dx: NSTEMI (non-ST elevated [facility]) [facility] [I21.4]  Near syncope [R55]    Subjective:    Patient was admitted with NSTEMI (non-ST elevated myocardial infarction) ([facility] [I21.4]  Near syncope [R55]. Sitting up eating lunch. Patient has no complaints. No syncope or presyncopal symptoms. No chest pain or pressure. Not short of breath. ROS: denies fever, chills, cp, sob, n/v, HA unless stated above.      sodium chloride flush  5-40 mL IntraVENous 2 times per day    aspirin  81 mg Oral Daily    vitamin D  2,000 Units Oral Daily    famotidine  20 mg Oral Daily    folic acid  5,170 mcg Oral Daily    lactobacillus  1 capsule Oral Daily    latanoprost  1 drop Both Eyes Nightly    vitamin B-12  1,000 mcg Oral Daily    vitamin B-6  100 mg Oral Daily    brimonidine  1 drop Both Eyes BID    And    timolol  1 drop Both Eyes BID    atorvastatin  20 mg Oral Nightly    metoprolol tartrate  25 mg Oral BID    cetirizine  5 mg Oral Daily     heparin (porcine), 60 Units/kg, PRN  heparin (porcine), 30 Units/kg, PRN  sodium chloride flush, 5-40 mL, PRN  sodium chloride, , PRN  ondansetron, 4 mg, Q8H PRN   Or  ondansetron, 4 mg, Q6H PRN  acetaminophen, 650 mg, Q6H PRN   Or  acetaminophen, 650 mg, Q6H PRN  polyethylene glycol, 17 g, Daily PRN  HYDROcodone-acetaminophen, 1 tablet, Q12H PRN         Objective:    BP (!) 155/70   Pulse 79   Temp 98.1 °F (36.7 °C) (Oral)   Resp 18   Ht 5' 10\" (1.778 m)   Wt 161 lb (73 kg)   SpO2 97%   BMI 23.10 kg/m²   General Appearance: alert and oriented to person, place and time, well developed and well- nourished, in no acute distress  Skin: warm and dry, no rash or erythema  Head: normocephalic and atraumatic  Neck: supple and non-tender without mass, no thyromegaly or thyroid nodules, no cervical lymphadenopathy  Pulmonary/Chest: clear to auscultation bilaterally- no wheezes, rales or rhonchi, normal air movement, no respiratory distress  Cardiovascular: normal rate, regular rhythm, normal S1 and S2, no murmurs, rubs, clicks, or gallops, distal pulses intact, no carotid bruits  Abdomen: soft, non-tender, non-distended, normal bowel sounds, no masses or organomegaly  Extremities: no cyanosis, clubbing or edema      Recent Labs     07/09/22  2014 07/10/22  0848 07/11/22  1055    137 136   K 4.3 4.1 4.7   CL 98 104 101   CO2 18* 21* 22   BUN 25* 21 25*   CREATININE 1.3* 1.1 1.3*   GLUCOSE 138* 116* 118*   CALCIUM 8.8 8.9 9.2       Recent Labs     07/09/22  2014 07/10/22  0835   WBC 10.9 9.0   RBC 3.60* 3.73*   HGB 11.3* 11.6*   HCT 34.1* 35.3*   MCV 94.7 94.6   MCH 31.4 31.1   MCHC 33.1 32.9   RDW 15.7* 15.8*    145   MPV 10.4 10.8       Radiology:   XR CHEST PORTABLE   Final Result   No acute cardiopulmonary abnormality. Assessment:    Principal Problem:    Non-ST elevation myocardial infarction (NSTEMI) ([facility]  Active Problems:    Primary hypertension    Stage 3a chronic kidney disease (HCC)    Coronary artery disease involving native coronary artery of native heart without angina pectoris    Aortic valve disease    Stage 3b chronic kidney disease ([facility]  Resolved Problems:    * No resolved hospital problems. *      Plan:    1. Non-ST elevated MI  Troponin has peaked at 420. Today is 205. Patient without any cardiac symptoms. Discussed with cardiology, continue IV heparin drip for 1 more day. Continue aspirin, Lipitor, and Lopressor. Patient has clearly stated that he has no wish to pursue any coronary intervention. 2.  Presyncope  History would suggest this is vasovagal, but patient also with MI. Echo does show inferior wall motion abnormalities. EF 45 to 50%  Patient does not seem to be in active acute heart failure.     Electronically signed by Carmelo Hearn MD on 7/11/2022 at 2:37 PM Never smoker

## 2022-11-25 NOTE — PROGRESS NOTE ADULT - PROVIDER SPECIALTY LIST ADULT
From: Caty Johnson  To: Tonia Mathur MD  Sent: 4/26/2018 7:50 PM CDT  Subject: Blood work    Thank you!   Hospitalist

## 2022-11-26 DIAGNOSIS — R53.2 FUNCTIONAL QUADRIPLEGIA: ICD-10-CM

## 2022-11-26 DIAGNOSIS — Z71.89 OTHER SPECIFIED COUNSELING: ICD-10-CM

## 2022-11-26 LAB
IRON SATN MFR SERPL: 17 UG/DL — LOW (ref 45–165)
IRON SATN MFR SERPL: 7 % — LOW (ref 16–55)
TIBC SERPL-MCNC: 240 UG/DL — SIGNIFICANT CHANGE UP (ref 220–430)
UIBC SERPL-MCNC: 223 UG/DL — SIGNIFICANT CHANGE UP (ref 110–370)

## 2022-11-29 LAB
CULTURE RESULTS: SIGNIFICANT CHANGE UP
CULTURE RESULTS: SIGNIFICANT CHANGE UP
SPECIMEN SOURCE: SIGNIFICANT CHANGE UP
SPECIMEN SOURCE: SIGNIFICANT CHANGE UP

## 2024-12-10 NOTE — ED ADULT TRIAGE NOTE - PATIENT ON (OXYGEN DELIVERY METHOD)
painful. Reviewed HEP previously given. WIll progress as brooks.    [] No change.     [] Other:   [x] Patient would continue to benefit from skilled physical therapy services in order to: improve upon deficits listed above that are affecting his ability to complete household tasks such as standing at kitchen sick for a period of time, sit for a period of time, or walk for a distance without compensations and increased fall risk     STG: (to be met in 8 treatments) Reassessed on 12/2/24  ? Pain: </= 3/10 pain in LB to improve his tolerance to extended positions. Not Met, 7-8/10 pain due to low back spasm  ? ROM: Improve L side bending to </= 56cm from the floor to improve his ability to  objects from the floor. Met, 45cm from floor on 12/2/24  ? Strength: Improve erector spinae strength to 3+/5 to improve tolerance to extended standing and sitting positions. Met, 3+/5 on 12/2/2024  ? Strength: Improve L hip abduction to >/= 4+ to improve ability to ambulate without compensated movement patterns. Met, 4+/5 on 12/2/24  Patient to be independent with home exercise program as demonstrated by performance with correct form without cues.  LTG: (to be met in 16 treatments)  Pt to improve score on the Modified Oswestry to </= 7/50 to improve ability to complete ADL's. Not Met, 12/2/24  Per pt report, he will have reduced \"clusters\" or flare up's in symptoms causing sudden weakness in BOLIVAR LE's to improve tolerance to extended walking without increased fall risk. Met, 12/2/24    Pt. Education:  [x] Yes  [] No  [] Reviewed Prior HEP/Ed  Method of Education: [x] Verbal  [x] Demo  [] Written  Comprehension of Education:  [x] Verbalizes understanding.  [x] Demonstrates understanding.  [] Needs review.  [] Demonstrates/verbalizes HEP/Ed previously given.     Access Code: [email or web address]  URL: [email or web address]  Date: 11/29/2024  Prepared by: Didi Pride    Exercises  - Clamshell with Resistance  - 1 x daily - 7 x weekly 
room air